# Patient Record
Sex: FEMALE | Race: OTHER
[De-identification: names, ages, dates, MRNs, and addresses within clinical notes are randomized per-mention and may not be internally consistent; named-entity substitution may affect disease eponyms.]

---

## 2019-02-11 ENCOUNTER — HOSPITAL ENCOUNTER (EMERGENCY)
Dept: HOSPITAL 80 - FED | Age: 34
LOS: 1 days | Discharge: HOME | End: 2019-02-12
Payer: COMMERCIAL

## 2019-02-11 DIAGNOSIS — R19.7: ICD-10-CM

## 2019-02-11 DIAGNOSIS — E86.9: ICD-10-CM

## 2019-02-11 DIAGNOSIS — R10.11: ICD-10-CM

## 2019-02-11 DIAGNOSIS — R11.2: Primary | ICD-10-CM

## 2019-02-12 VITALS — DIASTOLIC BLOOD PRESSURE: 67 MMHG | SYSTOLIC BLOOD PRESSURE: 106 MMHG

## 2019-02-12 LAB — PLATELET # BLD: 185 10^3/UL (ref 150–400)

## 2019-02-12 NOTE — EDPHY
H & P


Stated Complaint: RUQ ABD PAIN, N/V, DIARRHEA


Time Seen by Provider: 02/11/19 23:58


HPI/ROS: 





HPI


The patient presents with nausea, vomiting, diarrhea which began tonight and 

awoke her from sleep.  She initially developed watery loose diarrhea without 

any blood and began vomiting.  She had several episodes of nonbloody nonbilious 

emesis.  She now feels very nauseated.





For the last 3 days she has had right upper quadrant abdominal pain which has 

been intermittent and is sharp, worse when she takes a deep breath.  She 

thought it was related to a recent URI type illness that she and several family 

members had.  She had been coughing quite a bit at that time.  The cough has 

resolved though she is left with this pain.  It is mild in severity and is 

currently not present.





REVIEW OF SYSTEMS


10 systems were reviewed and negative with the exception of the elements 

mentioned in the history of present illness.





PMHx:  Healthy





Soc Hx:  Mother to a 2-year-old child, here with her 








PHYSICAL


General Appearance: Alert, no distress


Eyes: Pupils equal and round no pallor or injection


ENT, Mouth: Mucous membranes moist


Respiratory: There are no retractions, lungs are clear to auscultation, there 

is no tenderness to the chest wall


Cardiovascular:  Regular rate and rhythm 


Gastrointestinal:  Abdomen is soft and mildly tender in the right upper quadrant

, no masses, bowel sounds normal 


Neurological:  A&O, moves all extremities


Skin:  Warm and dry, no rashes


Musculoskeletal: Neck is supple non tender 


Extremities:  symmetrical, full range of motion 


Psychiatric:  Patient is oriented X 3, there is no agitation 





Source: Patient


Exam Limitations: No limitations





- Personal History


LMP (Females 10-55): IUD In Place


Current Tetanus Diphtheria and Acellular Pertussis (TDAP): Yes





- Medical/Surgical History


Hx Asthma: No


Hx Chronic Respiratory Disease: No


Hx Diabetes: No


Hx Cardiac Disease: No


Hx Renal Disease: No


Hx Cirrhosis: No


Hx Alcoholism: No


Hx HIV/AIDS: No


Hx Splenectomy or Spleen Trauma: No


Other PMH: DENIES





- Social History


Smoking Status: Never smoked


Constitutional: 


 Initial Vital Signs











Temperature (C)  36.8 C   02/11/19 23:44


 


Heart Rate  121 H  02/11/19 23:44


 


Respiratory Rate  16   02/11/19 23:44


 


Blood Pressure  118/67   02/11/19 23:44


 


O2 Sat (%)  97   02/11/19 23:44








 











O2 Delivery Mode               Room Air














Allergies/Adverse Reactions: 


 





No Known Allergies Allergy (Unverified 02/11/19 23:43)


 








Home Medications: 














 Medication  Instructions  Recorded


 


NK [No Known Home Meds]  02/11/19














Medical Decision Making


Procedures: 





Bedside limited abdominal Ultrasound- performed and interpreted by me.


Indication:  Right upper quadrant abdominal pain


Findings:  No gallstones visualized, no pericholecystic fluid, no gallbladder 

wall thickening


Impression:  No sonographic evidence of cholecystitis.


Differential Diagnosis: 





33-year-old female who is healthy presents with 3 days of intermittent right 

upper quadrant pain, now with several hours of nausea, vomiting, diarrhea.  Here

, she is tachycardic, she is mildly tender in the right upper quadrant.





Differential diagnosis includes biliary colic, cholecystitis, gastritis, 

costochondritis after URI, viral gastroenteritis, toxin mediated enterocolitis.





Patient had IV line established and was given IV fluids and Zofran.  Bedside 

right upper quadrant ultrasound was performed showing no evidence of gallstones.





Labs demonstrated leukocytosis with left shift suggesting infectious process.





On reassessment she felt much better.  She had no ongoing symptoms.  She 

tolerated p. O. Challenge.  She will be discharged home with Zofran.  Ultimately

, I suspect she has a gastroenteritis.  





- Data Points


Laboratory Results: 


 Laboratory Results





 02/11/19 23:55 





 02/11/19 23:55 





 











  02/11/19 02/11/19





  23:55 23:55


 


WBC    13.49 10^3/uL H 10^3/uL





    (3.80-9.50) 


 


RBC    5.05 10^6/uL 10^6/uL





    (4.18-5.33) 


 


Hgb    15.3 g/dL g/dL





    (12.6-16.3) 


 


Hct    46.7 % %





    (38.0-47.0) 


 


MCV    92.5 fL fL





    (81.5-99.8) 


 


MCH    30.3 pg pg





    (27.9-34.1) 


 


MCHC    32.8 g/dL g/dL





    (32.4-36.7) 


 


RDW    13.1 % %





    (11.5-15.2) 


 


Plt Count    185 10^3/uL 10^3/uL





    (150-400) 


 


MPV    11.0 fL fL





    (8.7-11.7) 


 


Neut % (Auto)    90.5 % H %





    (39.3-74.2) 


 


Lymph % (Auto)    6.1 % L %





    (15.0-45.0) 


 


Mono % (Auto)    2.4 % L %





    (4.5-13.0) 


 


Eos % (Auto)    0.6 % %





    (0.6-7.6) 


 


Baso % (Auto)    0.2 % L %





    (0.3-1.7) 


 


Nucleat RBC Rel Count    0.0 % %





    (0.0-0.2) 


 


Absolute Neuts (auto)    12.20 10^3/uL H 10^3/uL





    (1.70-6.50) 


 


Absolute Lymphs (auto)    0.82 10^3/uL L 10^3/uL





    (1.00-3.00) 


 


Absolute Monos (auto)    0.33 10^3/uL 10^3/uL





    (0.30-0.80) 


 


Absolute Eos (auto)    0.08 10^3/uL 10^3/uL





    (0.03-0.40) 


 


Absolute Basos (auto)    0.03 10^3/uL 10^3/uL





    (0.02-0.10) 


 


Absolute Nucleated RBC    0.00 10^3/uL 10^3/uL





    (0-0.01) 


 


Immature Gran %    0.2 % %





    (0.0-1.1) 


 


Immature Gran #    0.03 10^3/uL 10^3/uL





    (0.00-0.10) 


 


Sodium  142 mEq/L mEq/L  





   (135-145)  


 


Potassium  4.3 mEq/L mEq/L  





   (3.5-5.2)  


 


Chloride  110 mEq/L mEq/L  





   ()  


 


Carbon Dioxide  21 mEq/l L mEq/l  





   (22-31)  


 


Anion Gap  11 mEq/L mEq/L  





   (6-14)  


 


BUN  16 mg/dL mg/dL  





   (7-23)  


 


Creatinine  0.8 mg/dL mg/dL  





   (0.6-1.0)  


 


Estimated GFR  > 60   





   


 


Glucose  133 mg/dL H mg/dL  





   ()  


 


Calcium  9.8 mg/dL mg/dL  





   (8.5-10.4)  


 


Total Bilirubin  1.2 mg/dL mg/dL  





   (0.1-1.4)  


 


AST  22 IU/L IU/L  





   (14-46)  


 


ALT  28 IU/L IU/L  





   (9-52)  


 


Alkaline Phosphatase  94 IU/L IU/L  





   ()  


 


Total Protein  8.2 g/dL g/dL  





   (6.3-8.2)  


 


Albumin  5.1 g/dL H g/dL  





   (3.5-5.0)  


 


Lipase  55 IU/L IU/L  





   ()  











Medications Given: 


 








Discontinued Medications





Sodium Chloride (Ns)  1,000 mls @ 0 mls/hr IV EDNOW ONE; Wide Open


   PRN Reason: Protocol


   Stop: 02/11/19 23:59


   Last Admin: 02/12/19 00:04 Dose:  1,000 mls


Ondansetron HCl (Zofran)  4 mg IVP EDNOW ONE


   Stop: 02/11/19 23:59


   Last Admin: 02/12/19 00:04 Dose:  4 mg


Ondansetron HCl (Zofran Odt 4 Mg Prepack#2)  1 btl TAKEHOME EDNOW ONE


   Stop: 02/12/19 00:25


   Last Admin: 02/12/19 00:37 Dose:  1 btl








Departure





- Departure


Disposition: Home, Routine, Self-Care


Clinical Impression: 


 RUQ abdominal pain, Nausea vomiting and diarrhea





Condition: Good


Instructions:  Acute Nausea and Vomiting (ED)


Additional Instructions: 


Please return if your worse in any way.  Make sure to drink sips of clear 

liquid until your feeling better.  Then you can advance her diet to a bland 

foods.


Referrals: 


NONE *PRIMARY CARE P,. [Primary Care Provider] - As per Instructions